# Patient Record
Sex: FEMALE | Race: BLACK OR AFRICAN AMERICAN | NOT HISPANIC OR LATINO
[De-identification: names, ages, dates, MRNs, and addresses within clinical notes are randomized per-mention and may not be internally consistent; named-entity substitution may affect disease eponyms.]

---

## 2023-09-18 ENCOUNTER — APPOINTMENT (OUTPATIENT)
Dept: ANTEPARTUM | Facility: CLINIC | Age: 33
End: 2023-09-18

## 2023-09-20 PROBLEM — Z00.00 ENCOUNTER FOR PREVENTIVE HEALTH EXAMINATION: Status: ACTIVE | Noted: 2023-09-20

## 2023-10-16 ENCOUNTER — NON-APPOINTMENT (OUTPATIENT)
Age: 33
End: 2023-10-16

## 2023-10-19 ENCOUNTER — APPOINTMENT (OUTPATIENT)
Dept: MATERNAL FETAL MEDICINE | Facility: CLINIC | Age: 33
End: 2023-10-19

## 2023-11-13 ENCOUNTER — ASOB RESULT (OUTPATIENT)
Age: 33
End: 2023-11-13

## 2023-11-13 ENCOUNTER — APPOINTMENT (OUTPATIENT)
Dept: ANTEPARTUM | Facility: CLINIC | Age: 33
End: 2023-11-13
Payer: MEDICAID

## 2023-11-13 PROCEDURE — 76811 OB US DETAILED SNGL FETUS: CPT

## 2023-11-30 ENCOUNTER — ASOB RESULT (OUTPATIENT)
Age: 33
End: 2023-11-30

## 2023-11-30 ENCOUNTER — APPOINTMENT (OUTPATIENT)
Dept: ANTEPARTUM | Facility: CLINIC | Age: 33
End: 2023-11-30
Payer: MEDICAID

## 2023-11-30 PROCEDURE — 76816 OB US FOLLOW-UP PER FETUS: CPT

## 2023-12-26 ENCOUNTER — APPOINTMENT (OUTPATIENT)
Dept: ANTEPARTUM | Facility: CLINIC | Age: 33
End: 2023-12-26
Payer: MEDICAID

## 2023-12-26 ENCOUNTER — ASOB RESULT (OUTPATIENT)
Age: 33
End: 2023-12-26

## 2023-12-26 PROCEDURE — 76819 FETAL BIOPHYS PROFIL W/O NST: CPT | Mod: 59

## 2023-12-26 PROCEDURE — 76816 OB US FOLLOW-UP PER FETUS: CPT

## 2024-01-22 ENCOUNTER — APPOINTMENT (OUTPATIENT)
Dept: MATERNAL FETAL MEDICINE | Facility: CLINIC | Age: 34
End: 2024-01-22
Payer: MEDICAID

## 2024-01-22 ENCOUNTER — ASOB RESULT (OUTPATIENT)
Age: 34
End: 2024-01-22

## 2024-01-22 DIAGNOSIS — O24.419 GESTATIONAL DIABETES MELLITUS IN PREGNANCY, UNSPECIFIED CONTROL: ICD-10-CM

## 2024-01-22 PROCEDURE — G0109 DIAB MANAGE TRN IND/GROUP: CPT

## 2024-01-24 ENCOUNTER — ASOB RESULT (OUTPATIENT)
Age: 34
End: 2024-01-24

## 2024-01-24 ENCOUNTER — APPOINTMENT (OUTPATIENT)
Dept: ANTEPARTUM | Facility: CLINIC | Age: 34
End: 2024-01-24
Payer: MEDICAID

## 2024-01-24 ENCOUNTER — TRANSCRIPTION ENCOUNTER (OUTPATIENT)
Age: 34
End: 2024-01-24

## 2024-01-24 PROCEDURE — 76819 FETAL BIOPHYS PROFIL W/O NST: CPT

## 2024-01-24 PROCEDURE — 76816 OB US FOLLOW-UP PER FETUS: CPT

## 2024-01-24 RX ORDER — INSULIN HUMAN 100 [IU]/ML
100 INJECTION, SUSPENSION SUBCUTANEOUS
Qty: 1 | Refills: 2 | Status: ACTIVE | COMMUNITY
Start: 2024-01-22 | End: 1900-01-01

## 2024-01-24 RX ORDER — LANCETS 33 GAUGE
EACH MISCELLANEOUS
Qty: 2 | Refills: 2 | Status: ACTIVE | COMMUNITY
Start: 2024-01-22 | End: 1900-01-01

## 2024-01-24 RX ORDER — BLOOD-GLUCOSE METER
W/DEVICE KIT MISCELLANEOUS
Qty: 1 | Refills: 0 | Status: ACTIVE | COMMUNITY
Start: 2024-01-22 | End: 1900-01-01

## 2024-01-24 RX ORDER — URINE ACETONE TEST STRIPS
STRIP MISCELLANEOUS
Qty: 1 | Refills: 2 | Status: ACTIVE | COMMUNITY
Start: 2024-01-22 | End: 1900-01-01

## 2024-01-24 RX ORDER — PEN NEEDLE, DIABETIC 32GX 5/32"
32G X 4 MM NEEDLE, DISPOSABLE MISCELLANEOUS
Qty: 2 | Refills: 1 | Status: ACTIVE | COMMUNITY
Start: 2024-01-22 | End: 1900-01-01

## 2024-01-24 RX ORDER — BLOOD-GLUCOSE METER
KIT MISCELLANEOUS 4 TIMES DAILY
Qty: 2 | Refills: 2 | Status: ACTIVE | COMMUNITY
Start: 2024-01-22 | End: 1900-01-01

## 2024-02-02 ENCOUNTER — APPOINTMENT (OUTPATIENT)
Dept: MATERNAL FETAL MEDICINE | Facility: CLINIC | Age: 34
End: 2024-02-02

## 2024-02-20 ENCOUNTER — ASOB RESULT (OUTPATIENT)
Age: 34
End: 2024-02-20

## 2024-02-20 ENCOUNTER — APPOINTMENT (OUTPATIENT)
Dept: MATERNAL FETAL MEDICINE | Facility: CLINIC | Age: 34
End: 2024-02-20
Payer: MEDICAID

## 2024-02-20 PROCEDURE — G0108 DIAB MANAGE TRN  PER INDIV: CPT | Mod: 95

## 2024-02-21 ENCOUNTER — APPOINTMENT (OUTPATIENT)
Dept: ANTEPARTUM | Facility: CLINIC | Age: 34
End: 2024-02-21
Payer: MEDICAID

## 2024-02-21 ENCOUNTER — ASOB RESULT (OUTPATIENT)
Age: 34
End: 2024-02-21

## 2024-02-21 PROCEDURE — 76816 OB US FOLLOW-UP PER FETUS: CPT

## 2024-02-21 PROCEDURE — 76819 FETAL BIOPHYS PROFIL W/O NST: CPT | Mod: 59

## 2024-02-29 ENCOUNTER — APPOINTMENT (OUTPATIENT)
Dept: ANTEPARTUM | Facility: CLINIC | Age: 34
End: 2024-02-29
Payer: MEDICAID

## 2024-02-29 ENCOUNTER — ASOB RESULT (OUTPATIENT)
Age: 34
End: 2024-02-29

## 2024-02-29 PROCEDURE — 76818 FETAL BIOPHYS PROFILE W/NST: CPT

## 2024-03-05 ENCOUNTER — APPOINTMENT (OUTPATIENT)
Dept: MATERNAL FETAL MEDICINE | Facility: CLINIC | Age: 34
End: 2024-03-05

## 2024-03-07 ENCOUNTER — ASOB RESULT (OUTPATIENT)
Age: 34
End: 2024-03-07

## 2024-03-07 ENCOUNTER — APPOINTMENT (OUTPATIENT)
Dept: ANTEPARTUM | Facility: CLINIC | Age: 34
End: 2024-03-07
Payer: MEDICAID

## 2024-03-07 PROCEDURE — 76818 FETAL BIOPHYS PROFILE W/NST: CPT

## 2024-03-13 ENCOUNTER — TRANSCRIPTION ENCOUNTER (OUTPATIENT)
Age: 34
End: 2024-03-13

## 2024-03-13 ENCOUNTER — INPATIENT (INPATIENT)
Facility: HOSPITAL | Age: 34
LOS: 3 days | Discharge: HOME | End: 2024-03-17
Attending: STUDENT IN AN ORGANIZED HEALTH CARE EDUCATION/TRAINING PROGRAM | Admitting: STUDENT IN AN ORGANIZED HEALTH CARE EDUCATION/TRAINING PROGRAM

## 2024-03-13 VITALS
DIASTOLIC BLOOD PRESSURE: 96 MMHG | SYSTOLIC BLOOD PRESSURE: 142 MMHG | TEMPERATURE: 99 F | HEART RATE: 107 BPM | RESPIRATION RATE: 16 BRPM

## 2024-03-13 DIAGNOSIS — O26.899 OTHER SPECIFIED PREGNANCY RELATED CONDITIONS, UNSPECIFIED TRIMESTER: ICD-10-CM

## 2024-03-13 DIAGNOSIS — Z98.891 HISTORY OF UTERINE SCAR FROM PREVIOUS SURGERY: Chronic | ICD-10-CM

## 2024-03-13 LAB
ALBUMIN SERPL ELPH-MCNC: 3.5 G/DL — SIGNIFICANT CHANGE UP (ref 3.3–5)
ALP SERPL-CCNC: 96 U/L — SIGNIFICANT CHANGE UP (ref 40–120)
ALT FLD-CCNC: 14 U/L — SIGNIFICANT CHANGE UP (ref 4–33)
ANION GAP SERPL CALC-SCNC: 12 MMOL/L — SIGNIFICANT CHANGE UP (ref 7–14)
APPEARANCE UR: ABNORMAL
APTT BLD: 26 SEC — SIGNIFICANT CHANGE UP (ref 24.5–35.6)
AST SERPL-CCNC: 14 U/L — SIGNIFICANT CHANGE UP (ref 4–32)
BACTERIA # UR AUTO: ABNORMAL /HPF
BASOPHILS # BLD AUTO: 0.02 K/UL — SIGNIFICANT CHANGE UP (ref 0–0.2)
BASOPHILS NFR BLD AUTO: 0.2 % — SIGNIFICANT CHANGE UP (ref 0–2)
BILIRUB SERPL-MCNC: 0.2 MG/DL — SIGNIFICANT CHANGE UP (ref 0.2–1.2)
BILIRUB UR-MCNC: NEGATIVE — SIGNIFICANT CHANGE UP
BLD GP AB SCN SERPL QL: NEGATIVE — SIGNIFICANT CHANGE UP
BUN SERPL-MCNC: 18 MG/DL — SIGNIFICANT CHANGE UP (ref 7–23)
CALCIUM SERPL-MCNC: 9.6 MG/DL — SIGNIFICANT CHANGE UP (ref 8.4–10.5)
CAST: 11 /LPF — HIGH (ref 0–4)
CHLORIDE SERPL-SCNC: 106 MMOL/L — SIGNIFICANT CHANGE UP (ref 98–107)
CO2 SERPL-SCNC: 19 MMOL/L — LOW (ref 22–31)
COARSE GRAN CASTS #/AREA URNS AUTO: PRESENT
COLOR SPEC: SIGNIFICANT CHANGE UP
CREAT ?TM UR-MCNC: 312 MG/DL — SIGNIFICANT CHANGE UP
CREAT SERPL-MCNC: 0.7 MG/DL — SIGNIFICANT CHANGE UP (ref 0.5–1.3)
DIFF PNL FLD: NEGATIVE — SIGNIFICANT CHANGE UP
EGFR: 119 ML/MIN/1.73M2 — SIGNIFICANT CHANGE UP
EOSINOPHIL # BLD AUTO: 0.13 K/UL — SIGNIFICANT CHANGE UP (ref 0–0.5)
EOSINOPHIL NFR BLD AUTO: 1.3 % — SIGNIFICANT CHANGE UP (ref 0–6)
FIBRINOGEN PPP-MCNC: 592 MG/DL — HIGH (ref 200–465)
GLUCOSE BLDC GLUCOMTR-MCNC: 124 MG/DL — HIGH (ref 70–99)
GLUCOSE SERPL-MCNC: 145 MG/DL — HIGH (ref 70–99)
GLUCOSE UR QL: >=1000 MG/DL
HCT VFR BLD CALC: 35.1 % — SIGNIFICANT CHANGE UP (ref 34.5–45)
HGB BLD-MCNC: 11.8 G/DL — SIGNIFICANT CHANGE UP (ref 11.5–15.5)
IANC: 6.83 K/UL — SIGNIFICANT CHANGE UP (ref 1.8–7.4)
IMM GRANULOCYTES NFR BLD AUTO: 0.5 % — SIGNIFICANT CHANGE UP (ref 0–0.9)
INR BLD: 1.01 RATIO — SIGNIFICANT CHANGE UP (ref 0.85–1.18)
KETONES UR-MCNC: ABNORMAL MG/DL
LDH SERPL L TO P-CCNC: 147 U/L — SIGNIFICANT CHANGE UP (ref 135–225)
LEUKOCYTE ESTERASE UR-ACNC: NEGATIVE — SIGNIFICANT CHANGE UP
LYMPHOCYTES # BLD AUTO: 2.16 K/UL — SIGNIFICANT CHANGE UP (ref 1–3.3)
LYMPHOCYTES # BLD AUTO: 22 % — SIGNIFICANT CHANGE UP (ref 13–44)
MCHC RBC-ENTMCNC: 30 PG — SIGNIFICANT CHANGE UP (ref 27–34)
MCHC RBC-ENTMCNC: 33.6 GM/DL — SIGNIFICANT CHANGE UP (ref 32–36)
MCV RBC AUTO: 89.3 FL — SIGNIFICANT CHANGE UP (ref 80–100)
MONOCYTES # BLD AUTO: 0.64 K/UL — SIGNIFICANT CHANGE UP (ref 0–0.9)
MONOCYTES NFR BLD AUTO: 6.5 % — SIGNIFICANT CHANGE UP (ref 2–14)
NEUTROPHILS # BLD AUTO: 6.83 K/UL — SIGNIFICANT CHANGE UP (ref 1.8–7.4)
NEUTROPHILS NFR BLD AUTO: 69.5 % — SIGNIFICANT CHANGE UP (ref 43–77)
NITRITE UR-MCNC: NEGATIVE — SIGNIFICANT CHANGE UP
NRBC # BLD: 0 /100 WBCS — SIGNIFICANT CHANGE UP (ref 0–0)
NRBC # FLD: 0 K/UL — SIGNIFICANT CHANGE UP (ref 0–0)
PH UR: 6 — SIGNIFICANT CHANGE UP (ref 5–8)
PLATELET # BLD AUTO: 443 K/UL — HIGH (ref 150–400)
POTASSIUM SERPL-MCNC: 4.5 MMOL/L — SIGNIFICANT CHANGE UP (ref 3.5–5.3)
POTASSIUM SERPL-SCNC: 4.5 MMOL/L — SIGNIFICANT CHANGE UP (ref 3.5–5.3)
PROT ?TM UR-MCNC: 285 MG/DL — SIGNIFICANT CHANGE UP
PROT SERPL-MCNC: 7.5 G/DL — SIGNIFICANT CHANGE UP (ref 6–8.3)
PROT UR-MCNC: 300 MG/DL
PROT/CREAT UR-RTO: 0.9 RATIO — HIGH (ref 0–0.2)
PROTHROM AB SERPL-ACNC: 11.4 SEC — SIGNIFICANT CHANGE UP (ref 9.5–13)
RBC # BLD: 3.93 M/UL — SIGNIFICANT CHANGE UP (ref 3.8–5.2)
RBC # FLD: 13.5 % — SIGNIFICANT CHANGE UP (ref 10.3–14.5)
RBC CASTS # UR COMP ASSIST: 1 /HPF — SIGNIFICANT CHANGE UP (ref 0–4)
REVIEW: SIGNIFICANT CHANGE UP
RH IG SCN BLD-IMP: POSITIVE — SIGNIFICANT CHANGE UP
RH IG SCN BLD-IMP: POSITIVE — SIGNIFICANT CHANGE UP
SODIUM SERPL-SCNC: 137 MMOL/L — SIGNIFICANT CHANGE UP (ref 135–145)
SP GR SPEC: 1.04 — HIGH (ref 1–1.03)
SQUAMOUS # UR AUTO: 4 /HPF — SIGNIFICANT CHANGE UP (ref 0–5)
URATE SERPL-MCNC: 4.8 MG/DL — SIGNIFICANT CHANGE UP (ref 2.5–7)
UROBILINOGEN FLD QL: 1 MG/DL — SIGNIFICANT CHANGE UP (ref 0.2–1)
WBC # BLD: 9.83 K/UL — SIGNIFICANT CHANGE UP (ref 3.8–10.5)
WBC # FLD AUTO: 9.83 K/UL — SIGNIFICANT CHANGE UP (ref 3.8–10.5)
WBC UR QL: 4 /HPF — SIGNIFICANT CHANGE UP (ref 0–5)

## 2024-03-13 DEVICE — INTERCEED 3 X 4": Type: IMPLANTABLE DEVICE | Status: FUNCTIONAL

## 2024-03-13 RX ORDER — CITRIC ACID/SODIUM CITRATE 300-500 MG
30 SOLUTION, ORAL ORAL ONCE
Refills: 0 | Status: COMPLETED | OUTPATIENT
Start: 2024-03-13 | End: 2024-03-13

## 2024-03-13 RX ORDER — SODIUM CHLORIDE 9 MG/ML
1000 INJECTION, SOLUTION INTRAVENOUS
Refills: 0 | Status: DISCONTINUED | OUTPATIENT
Start: 2024-03-13 | End: 2024-03-14

## 2024-03-13 RX ORDER — FAMOTIDINE 10 MG/ML
20 INJECTION INTRAVENOUS ONCE
Refills: 0 | Status: COMPLETED | OUTPATIENT
Start: 2024-03-13 | End: 2024-03-13

## 2024-03-13 RX ORDER — CHLORHEXIDINE GLUCONATE 213 G/1000ML
1 SOLUTION TOPICAL DAILY
Refills: 0 | Status: DISCONTINUED | OUTPATIENT
Start: 2024-03-13 | End: 2024-03-14

## 2024-03-13 RX ORDER — SODIUM CHLORIDE 9 MG/ML
500 INJECTION, SOLUTION INTRAVENOUS ONCE
Refills: 0 | Status: COMPLETED | OUTPATIENT
Start: 2024-03-13 | End: 2024-03-13

## 2024-03-13 RX ORDER — OXYTOCIN 10 UNIT/ML
333.33 VIAL (ML) INJECTION
Qty: 20 | Refills: 0 | Status: DISCONTINUED | OUTPATIENT
Start: 2024-03-13 | End: 2024-03-14

## 2024-03-13 RX ORDER — SODIUM CHLORIDE 9 MG/ML
1000 INJECTION, SOLUTION INTRAVENOUS ONCE
Refills: 0 | Status: DISCONTINUED | OUTPATIENT
Start: 2024-03-13 | End: 2024-03-14

## 2024-03-13 RX ADMIN — Medication 30 MILLILITER(S): at 23:13

## 2024-03-13 RX ADMIN — CHLORHEXIDINE GLUCONATE 1 APPLICATION(S): 213 SOLUTION TOPICAL at 21:53

## 2024-03-13 RX ADMIN — FAMOTIDINE 20 MILLIGRAM(S): 10 INJECTION INTRAVENOUS at 22:59

## 2024-03-13 RX ADMIN — SODIUM CHLORIDE 125 MILLILITER(S): 9 INJECTION, SOLUTION INTRAVENOUS at 21:53

## 2024-03-13 RX ADMIN — SODIUM CHLORIDE 500 MILLILITER(S): 9 INJECTION, SOLUTION INTRAVENOUS at 20:13

## 2024-03-13 NOTE — OB PROVIDER TRIAGE NOTE - HISTORY OF PRESENT ILLNESS
32y/o  @38.1wks presents from the office with elevated BPs 145/102, 145/99, 130/99.  Denies sob, epigastric pain, blurry vision, and HA  Reports good fetal movement  Denies LOF/VB    Allergies: Denies  Medications: PNV, Insulin 14units qhs (has not taken in 2 weeks)  NPO 1030

## 2024-03-13 NOTE — OB PROVIDER TRIAGE NOTE - NS_OBGYNHISTORY_OBGYN_ALL_OB_FT
Orthopaedic Surgery - Office Note  Russ Denis (89 y.o. female)   : 1965   MRN: [id number]  Encounter Date: 2023    Chief Complaint   Patient presents with   • Left Knee - Follow-up   • Right Knee - Follow-up       Assessment / Plan  Bilateral knee mild osteoarthritis     · After discussion of risk and benefits the patient agreed to proceed with bilateral steroid injections of her knees. These were performed and prepared sterilely and tolerated well. · We did discuss arthritis treatment going forward for both knees. This includes conservative treatment such as anti-inflammatories and modalities such as ice or heat. This also included steroid injections every 3 months versus viscosupplementation. She will contact us in the future she would like to proceed with viscosupplementation. · Continue with ice and NSAIDs/analgesics as needed. · Continue with activity as tolerated. Return if symptoms worsen or fail to improve. History of Present Illness  Russ Denis is a 66 y.o. female who presents today for bilateral knee pain secondary to arthritis. Last visit on 2023 patient did have a right knee steroid injection which helped up to recently. Prior to that patient received bilateral steroid injections on 2022. Patient has been doing well with steroid injections with pretty lasting relief with each of the shots. Patient describes her pain as achy. Patient states she has trouble going up and down stairs, getting in and out of the car, as well as long distance walking. Patient uses ice and OTC medications for pain. Patient has been working on weight loss. Patient is still considering viscosupplementation as an option going forward but is still holding off at this time. Review of Systems  Pertinent items are noted in HPI. All other systems were reviewed and are negative.     Physical Exam  /81   Pulse 62   Ht 5' 5" (1.651 m)   Wt 93.4 kg (206 lb)   BMI 34.28 kg/m² Cons: Appears well. No apparent distress. Psych: Alert. Oriented x3. Mood and affect normal.  Eyes: PERRLA, EOMI  Resp: Normal effort. No audible wheezing or stridor. CV: Palpable pulse. No discernable arrhythmia. No LE edema. Lymph:  No palpable cervical, axillary, or inguinal lymphadenopathy. Skin: Warm. No palpable masses. No visible lesions. Neuro: Normal muscle tone. Normal and symmetric DTR's. Bilateral Knee Exam  Alignment:  Normal knee alignment. Inspection:  No swelling. Palpation:  lateral joint line tenderness. ROM:  Normal knee ROM. Strength:  5/5 quadriceps and hamstrings. Stability:  No objective knee instability. Stable Varus / Valgus stress, Lachman, and Posterior drawer. Tests:  No pertinent positive or negative tests. Patella:  Not tested. Neurovascular:  Sensation intact in DP/SP/Villavicencio/Sa/T nerve distributions. 2+ DP & PT pulses. Gait:  Normal.    Studies Reviewed  No studies to review    Large joint arthrocentesis: bilateral knee  Universal Protocol:  Consent: Verbal consent obtained. Consent given by: patient  Patient identity confirmed: verbally with patient    Supporting Documentation  Indications: pain   Procedure Details  Location: knee - bilateral knee  Needle size: 25 G  Approach: anterolateral    Medications (Right): 6 mL bupivacaine (PF) 0.5 %; 1 mL methylPREDNISolone acetate 40 mg/mLMedications (Left): 6 mL bupivacaine (PF) 0.5 %; 1 mL methylPREDNISolone acetate 40 mg/mL   Patient tolerance: patient tolerated the procedure well with no immediate complications  Dressing:  Sterile dressing applied             Medical, Surgical, Family, and Social History  The patient's medical history, family history, and social history, were reviewed and updated as appropriate.     Past Medical History:   Diagnosis Date   • Ankle fracture    • Anxiety    • Asthma    • Bleeding hemorrhoids 4/13/2017   • Chronic anal fissure 11/29/2018    Formatting of this note might be different from the original. Added automatically from request for surgery [id number]   • Chronic venous embolism and thrombosis of deep vessels of lower extremity ([address])    • Costochondritis     RESOLVED: [id number]   • Depression    • Diabetes mellitus ([address]) 2021   • Endometriosis    • Fibromyalgia    • GERD (gastroesophageal reflux disease)    • Hematuria     LAST ASSESSED: [id number]   • Hydradenitis     axillary area and under breast   • Hypertension     LAST ASSESSED: [id number]   • Irregular heart beat    • Pulmonary embolism ([address])     RESOLED: [id number] - secondary to a lupron injection for endometriosis   • RBBB (right bundle branch block)    • Sleep apnea     Cannot tolerate CPAP   • Umbilical hernia     LAST ASSESSED: [id number]       Past Surgical History:   Procedure Laterality Date   • HERNIA REPAIR      umbilical   • HUMERUS FRACTURE SURGERY W/ IMPLANT Left    • LAPAROSCOPIC TOTAL HYSTERECTOMY     • PELVIC LAPAROSCOPY      x4 for endometriosis   • HI ESOPHAGOGASTRODUODENOSCOPY TRANSORAL DIAGNOSTIC N/A 2019    Procedure: ESOPHAGOGASTRODUODENOSCOPY (EGD) with bx;  Surgeon: Lu Escobedo MD;  Location: [facility];   Service: Gastroenterology       Family History   Problem Relation Age of Onset   • Hypertension Mother    • Diabetes Father    • Hypertension Father    • Obesity Father    • No Known Problems Maternal Grandmother    • No Known Problems Maternal Grandfather    • Breast cancer Paternal Grandmother 80   • No Known Problems Paternal Grandfather    • No Known Problems Maternal Aunt        Social History     Occupational History   • Occupation: Cleaning Offices   Tobacco Use   • Smoking status: Every Day     Packs/day: 0.50     Types: Cigarettes     Last attempt to quit: 8/10/2018     Years since quittin.0   • Smokeless tobacco: Never   • Tobacco comments:     smokes 1 cigarette occasionally   Vaping Use   • Vaping Use: Never used   Substance and Sexual Activity   • Alcohol use: No   • Drug use: No   • Sexual activity: Yes     Comment: seldom /hysterectomy       Allergies   Allergen Reactions   • Albuterol      Other reaction(s): felt weird   • Azithromycin GI Intolerance   • Duloxetine      Category: Adverse Reaction; Annotation - [id number]: Sweating   • Erythromycin Vomiting   • Hydrocodone-Acetaminophen      Other reaction(s): sweating, feel faint, diarrhea   • Hydrocodone-Acetaminophen    • Ketorolac Diarrhea and Nausea Only     Category: Adverse Reaction;  Annotation - [id number]: Symptoms were noted after injection into bursa with Toradol at orthopedics   • Penicillin G    • Penicillins Hives and Vomiting   • Tramadol          Current Outpatient Medications:   •  albuterol (PROVENTIL HFA,VENTOLIN HFA) 90 mcg/act inhaler, INHALE 2 PUFFS EVERY 6 HOURS AS NEEDED FOR WHEEZE, Disp: 18 g, Rfl: 2  •  Aspirin 81 MG EC tablet, Take by mouth, Disp: , Rfl:   •  azelastine (ASTELIN) 0.1 % nasal spray, 1 spray into each nostril 2 (two) times a day Use in each nostril as directed, Disp: 30 mL, Rfl: 2  •  clindamycin (CLEOCIN T) 1 % lotion, APPLY ONCE DAILY TO AFFECTED AREAS UNDER ARMS., Disp: , Rfl: 3  •  [employer] Olopatadine HCl 0.2 % opth drops, INSTILL 1 DROP INTO BOTH EYES DAILY, Disp: 7.5 mL, Rfl: 1  •  escitalopram (LEXAPRO) 20 mg tablet, TAKE 1 TABLET BY MOUTH EVERY DAY, Disp: 90 tablet, Rfl: 1  •  famotidine (PEPCID) 40 MG tablet, TAKE 1 TABLET BY MOUTH EVERY DAY, Disp: 90 tablet, Rfl: 3  •  fluticasone (FLONASE) 50 mcg/act nasal spray, SPRAY 1 SPRAY INTO EACH NOSTRIL EVERY DAY, Disp: 24 mL, Rfl: 2  •  loperamide (IMODIUM A-D) 2 MG tablet, Take 2 mg by mouth 4 (four) times a day as needed for diarrhea, Disp: , Rfl:   •  loratadine (CLARITIN) 10 mg tablet, Take 10 mg by mouth, Disp: , Rfl:   •  losartan (COZAAR) 50 mg tablet, Take 1 tablet (50 mg total) by mouth 2 (two) times a day, Disp: 120 tablet, Rfl: 3  •  montelukast (SINGULAIR) 10 mg tablet, TAKE 1 TABLET BY MOUTH EVERY DAY, Disp: 90 tablet, Rfl: 3  •  pantoprazole (PROTONIX) 40 mg tablet, TAKE 1 TABLET BY MOUTH EVERY DAY, Disp: 90 tablet, Rfl: 3  •  sucralfate (CARAFATE) 1 g tablet, TAKE 1 TABLET BY MOUTH FOUR TIMES A DAY, Disp: 360 tablet, Rfl: 1  •  traZODone (DESYREL) 50 mg tablet, TAKE 1 TABLET BY MOUTH EVERYDAY AT BEDTIME, Disp: 90 tablet, Rfl: 1  •  Blood Glucose Monitoring Suppl (OneTouch Verio) w/Device KIT, USE DAILY AS DIRECTED (Patient not taking: Reported on 8/14/2023), Disp: 1 kit, Rfl: 0  •  Cholecalciferol (VITAMIN D3) 1000 units CAPS, Take 2,000 Units by mouth (Patient not taking: Reported on 8/14/2023), Disp: , Rfl:   •  Cyanocobalamin (B-12) 1000 MCG CAPS, Take by mouth (Patient not taking: Reported on 8/14/2023), Disp: , Rfl:   •  [START ON 5/5/2024] dexamethasone (DECADRON) 1 mg tablet, Take 1 tab at 11pm and get cortisol levels checked between 7:00 a.m. and 9:00 a.m. on the next morning. Do not start before May 5, 2024. (Patient not taking: Reported on 8/7/2023 Do not start before May 5, 2024.), Disp: 1 tablet, Rfl: 0  •  EPINEPHrine (EPIPEN) 0.3 mg/0.3 mL SOAJ, AS NEEDED FOR ALLERGIC REACTION.  BRING TO ALLERGY SHOTS (Patient not taking: Reported on 6/26/2023), Disp: , Rfl:   •  glucose blood (OneTouch Verio) test strip, USE TO TEST ONCE DAILY (Patient not taking: Reported on 8/14/2023), Disp: 100 strip, Rfl: 1  •  ketotifen (ZADITOR) 0.025 % ophthalmic solution, INSTILL 1 DROP PER EYE DAILY AS NEEDED, Disp: , Rfl:   •  Klor-Con M20 20 MEQ tablet, , Disp: , Rfl:   •  OneTouch Delica Lancets 66O MISC, USE DAILY AS DIRECTED (Patient not taking: Reported on 8/14/2023), Disp: 100 each, Rfl: 0  •  pimecrolimus (ELIDEL) 1 % cream, APPLY TOPICALLY 2 TIMES A DAY AS NEEDED FOR RASH (Patient not taking: Reported on 8/14/2023), Disp: 30 g, Rfl: 2      Jeane Pena PA-C    Scribe Attestation    I,:   am acting as a scribe while in the presence of the attending physician.:       I,:   personally performed the services described in this documentation    as scribed in my presence.: GYN: Denies  OB: C/S 6/26/2014, breech/nuchal     AP course complicated by  -GDMA2- stopped her insulin 2 weeks ago  -Elevated BPs  -unknown gbs, has not been to the office in 6 weeks

## 2024-03-13 NOTE — OB RN TRIAGE NOTE - NSLDARRIVAL_OBGYN_ALL_OB_START_DATE
[Dear  ___] : Dear ~DESIREE, [Consult Letter:] : I had the pleasure of evaluating your patient, [unfilled]. [Please see my note below.] : Please see my note below. [Consult Closing:] : Thank you very much for allowing me to participate in the care of this patient.  If you have any questions, please do not hesitate to contact me. [FreeTextEntry2] : Dr. Cornel Winslow, Dr. Cyndee Lujan [Sincerely,] : Sincerely, [FreeTextEntry3] : Marcin Vasquez MD, FACS\par System Director, Endocrine Surgery\par [facility]\par  [DrAris  ___] : Dr. HOLLNAD 13-Mar-2024 18:37

## 2024-03-13 NOTE — OB PROVIDER TRIAGE NOTE - NSHPPHYSICALEXAM_GEN_ALL_CORE
Vital Signs Last 24 Hrs  T(C): 37.0 (13 Mar 2024 19:11), Max: 37.0 (13 Mar 2024 19:11)  T(F): 98.6 (13 Mar 2024 19:11), Max: 98.6 (13 Mar 2024 19:11)  HR: 102 (13 Mar 2024 19:44) (97 - 107)  BP: 136/86 (13 Mar 2024 19:44) (134/89 - 142/96)  RR: 16 (13 Mar 2024 19:11) (16 - 16)    Assessment reveals VSS  General: Female sitting comfortably in no apparent distress  Neuro: No facial asymmetry, no slurred speech, moves all 4 extremities  Mood: Alert and lucid, appropriate mood and affect  A&Ox3  Lungs- clear bilateral  Heart- normal rate and regular rhythm  Extremities- Warm, Dry, no edema present, good pulses   Abdomen soft, NT, gravid  Cat 1 tracing reactive, ctx every 2-5mins  Transabdominal Ultrasound- images saved ASOB  Vaginal Exam-         PLAN:  HELLP labs

## 2024-03-13 NOTE — OB PROVIDER H&P - HISTORY OF PRESENT ILLNESS
34y/o  @38.1wks presents from the office with elevated BPs 145/102, 145/99, 130/99. Patient is also a non compliant GDMA2  Denies sob, epigastric pain, blurry vision, and HA  Reports good fetal movement  Denies LOF/VB    Allergies: Denies  Medications: PNV, Insulin 14units qhs (has not taken in 2 weeks)  NPO 1030

## 2024-03-13 NOTE — OB PROVIDER H&P - NSLOWPPHRISK_OBGYN_A_OB
details…
Carlos Pregnancy/Less than or equal to 4 previous vaginal births/No known bleeding disorder/No history of postpartum hemorrhage

## 2024-03-13 NOTE — OB PROVIDER H&P - NS_OBGYNHISTORY_OBGYN_ALL_OB_FT
GYN: Denies  OB: C/S 6/26/2014, breech/nuchal     AP course complicated by  -GDMA2- stopped her insulin 2 weeks ago  -Elevated BPs  -unknown gbs, has not been to the office in 6 weeks

## 2024-03-13 NOTE — OB PROVIDER H&P - PROBLEM SELECTOR PLAN 1
Admit for repeat C/S  D/W Dr. Valderrama  Routine Orders  Labs Sent  HELLP labs pending  GDMA2- non compliant   GBS Unknown   Cat 1 tracing, minimal variability , Dr. Valderrama made aware   Huddle to be completed Admit for repeat C/S  D/W Dr. Valderrama  Routine Orders  Labs Sent  HELLP labs pending  GDMA2- non compliant   GBS Unknown   Cat 1 tracing, minimal variability , Dr. Valderrama made aware   Huddle to be completed  Dr. Damon (safety officerC) made aware of patient and Anesthesia made aware of section to go, will huddle when available Admit for repeat C/S  D/W Dr. Valderrama  Routine Orders  Labs Sent  HELLP labs pending  GDMA2- non compliant   GBS Unknown   Cat 2 tracing, minimal variability , Dr. Valderrama made aware   Huddle to be completed  Dr. Damon (safety officerC) made aware of patient and Anesthesia made aware of section to go, will huddle when available Admit for repeat C/S  D/W Dr. Valderrama  Routine Orders  Labs Sent  HELLP labs pending  GDMA2- non compliant   GBS Unknown   Cat 2 tracing, minimal variability , Dr. Valderrama made aware   Huddle to be completed  Dr. Damon (safety officerC) made aware of patient and Anesthesia made aware of section to go, will huddle when available    agree with above findings. 32yo  @ 38w1d with GDMA2 and Preeclampsia without severe features. Noncompliant with visit and medications. Nonreactive NST  Admit to L&D  Plan for RCS  F/u labs and FS   F/u BPs  China Valderrama MD MPH

## 2024-03-14 ENCOUNTER — TRANSCRIPTION ENCOUNTER (OUTPATIENT)
Age: 34
End: 2024-03-14

## 2024-03-14 ENCOUNTER — APPOINTMENT (OUTPATIENT)
Dept: ANTEPARTUM | Facility: CLINIC | Age: 34
End: 2024-03-14

## 2024-03-14 LAB
ALBUMIN SERPL ELPH-MCNC: 3.1 G/DL — LOW (ref 3.3–5)
ALP SERPL-CCNC: 78 U/L — SIGNIFICANT CHANGE UP (ref 40–120)
ALT FLD-CCNC: 13 U/L — SIGNIFICANT CHANGE UP (ref 4–33)
ANION GAP SERPL CALC-SCNC: 11 MMOL/L — SIGNIFICANT CHANGE UP (ref 7–14)
APTT BLD: 26.6 SEC — SIGNIFICANT CHANGE UP (ref 24.5–35.6)
AST SERPL-CCNC: 20 U/L — SIGNIFICANT CHANGE UP (ref 4–32)
BILIRUB SERPL-MCNC: 0.3 MG/DL — SIGNIFICANT CHANGE UP (ref 0.2–1.2)
BUN SERPL-MCNC: 17 MG/DL — SIGNIFICANT CHANGE UP (ref 7–23)
CALCIUM SERPL-MCNC: 8.8 MG/DL — SIGNIFICANT CHANGE UP (ref 8.4–10.5)
CHLORIDE SERPL-SCNC: 101 MMOL/L — SIGNIFICANT CHANGE UP (ref 98–107)
CO2 SERPL-SCNC: 17 MMOL/L — LOW (ref 22–31)
CREAT SERPL-MCNC: 0.71 MG/DL — SIGNIFICANT CHANGE UP (ref 0.5–1.3)
EGFR: 117 ML/MIN/1.73M2 — SIGNIFICANT CHANGE UP
FIBRINOGEN PPP-MCNC: 433 MG/DL — SIGNIFICANT CHANGE UP (ref 200–465)
GLUCOSE SERPL-MCNC: 213 MG/DL — HIGH (ref 70–99)
HCT VFR BLD CALC: 33 % — LOW (ref 34.5–45)
HGB BLD-MCNC: 11 G/DL — LOW (ref 11.5–15.5)
INR BLD: 1.04 RATIO — SIGNIFICANT CHANGE UP (ref 0.85–1.18)
LDH SERPL L TO P-CCNC: 209 U/L — SIGNIFICANT CHANGE UP (ref 135–225)
MCHC RBC-ENTMCNC: 30 PG — SIGNIFICANT CHANGE UP (ref 27–34)
MCHC RBC-ENTMCNC: 33.3 GM/DL — SIGNIFICANT CHANGE UP (ref 32–36)
MCV RBC AUTO: 89.9 FL — SIGNIFICANT CHANGE UP (ref 80–100)
NRBC # BLD: 0 /100 WBCS — SIGNIFICANT CHANGE UP (ref 0–0)
NRBC # FLD: 0 K/UL — SIGNIFICANT CHANGE UP (ref 0–0)
PLATELET # BLD AUTO: 352 K/UL — SIGNIFICANT CHANGE UP (ref 150–400)
POTASSIUM SERPL-MCNC: 4.4 MMOL/L — SIGNIFICANT CHANGE UP (ref 3.5–5.3)
POTASSIUM SERPL-SCNC: 4.4 MMOL/L — SIGNIFICANT CHANGE UP (ref 3.5–5.3)
PROT SERPL-MCNC: 6.2 G/DL — SIGNIFICANT CHANGE UP (ref 6–8.3)
PROTHROM AB SERPL-ACNC: 11.6 SEC — SIGNIFICANT CHANGE UP (ref 9.5–13)
RBC # BLD: 3.67 M/UL — LOW (ref 3.8–5.2)
RBC # FLD: 13.6 % — SIGNIFICANT CHANGE UP (ref 10.3–14.5)
SODIUM SERPL-SCNC: 129 MMOL/L — LOW (ref 135–145)
T PALLIDUM AB TITR SER: NEGATIVE — SIGNIFICANT CHANGE UP
URATE SERPL-MCNC: 5.1 MG/DL — SIGNIFICANT CHANGE UP (ref 2.5–7)
WBC # BLD: 14.93 K/UL — HIGH (ref 3.8–10.5)
WBC # FLD AUTO: 14.93 K/UL — HIGH (ref 3.8–10.5)

## 2024-03-14 RX ORDER — DIPHENHYDRAMINE HCL 50 MG
25 CAPSULE ORAL EVERY 6 HOURS
Refills: 0 | Status: DISCONTINUED | OUTPATIENT
Start: 2024-03-14 | End: 2024-03-17

## 2024-03-14 RX ORDER — SODIUM CHLORIDE 9 MG/ML
1000 INJECTION, SOLUTION INTRAVENOUS
Refills: 0 | Status: DISCONTINUED | OUTPATIENT
Start: 2024-03-14 | End: 2024-03-17

## 2024-03-14 RX ORDER — KETOROLAC TROMETHAMINE 30 MG/ML
30 SYRINGE (ML) INJECTION EVERY 6 HOURS
Refills: 0 | Status: DISCONTINUED | OUTPATIENT
Start: 2024-03-14 | End: 2024-03-15

## 2024-03-14 RX ORDER — IBUPROFEN 200 MG
1 TABLET ORAL
Qty: 0 | Refills: 0 | DISCHARGE
Start: 2024-03-14

## 2024-03-14 RX ORDER — MORPHINE SULFATE 50 MG/1
0.1 CAPSULE, EXTENDED RELEASE ORAL ONCE
Refills: 0 | Status: DISCONTINUED | OUTPATIENT
Start: 2024-03-14 | End: 2024-03-15

## 2024-03-14 RX ORDER — OXYTOCIN 10 UNIT/ML
333.33 VIAL (ML) INJECTION
Qty: 20 | Refills: 0 | Status: DISCONTINUED | OUTPATIENT
Start: 2024-03-14 | End: 2024-03-15

## 2024-03-14 RX ORDER — HEPARIN SODIUM 5000 [USP'U]/ML
5000 INJECTION INTRAVENOUS; SUBCUTANEOUS EVERY 12 HOURS
Refills: 0 | Status: DISCONTINUED | OUTPATIENT
Start: 2024-03-14 | End: 2024-03-17

## 2024-03-14 RX ORDER — ACETAMINOPHEN 500 MG
3 TABLET ORAL
Qty: 0 | Refills: 0 | DISCHARGE
Start: 2024-03-14

## 2024-03-14 RX ORDER — IBUPROFEN 200 MG
600 TABLET ORAL EVERY 6 HOURS
Refills: 0 | Status: COMPLETED | OUTPATIENT
Start: 2024-03-14 | End: 2025-02-10

## 2024-03-14 RX ORDER — ACETAMINOPHEN 500 MG
1000 TABLET ORAL ONCE
Refills: 0 | Status: COMPLETED | OUTPATIENT
Start: 2024-03-14 | End: 2024-03-14

## 2024-03-14 RX ORDER — OXYCODONE HYDROCHLORIDE 5 MG/1
5 TABLET ORAL
Refills: 0 | Status: COMPLETED | OUTPATIENT
Start: 2024-03-14 | End: 2024-03-21

## 2024-03-14 RX ORDER — BUTORPHANOL TARTRATE 2 MG/ML
0.25 INJECTION, SOLUTION INTRAMUSCULAR; INTRAVENOUS EVERY 6 HOURS
Refills: 0 | Status: DISCONTINUED | OUTPATIENT
Start: 2024-03-14 | End: 2024-03-14

## 2024-03-14 RX ORDER — OXYCODONE HYDROCHLORIDE 5 MG/1
10 TABLET ORAL
Refills: 0 | Status: DISCONTINUED | OUTPATIENT
Start: 2024-03-14 | End: 2024-03-14

## 2024-03-14 RX ORDER — ONDANSETRON 8 MG/1
4 TABLET, FILM COATED ORAL EVERY 6 HOURS
Refills: 0 | Status: DISCONTINUED | OUTPATIENT
Start: 2024-03-14 | End: 2024-03-15

## 2024-03-14 RX ORDER — ACETAMINOPHEN 500 MG
975 TABLET ORAL
Refills: 0 | Status: DISCONTINUED | OUTPATIENT
Start: 2024-03-14 | End: 2024-03-17

## 2024-03-14 RX ORDER — OXYCODONE HYDROCHLORIDE 5 MG/1
5 TABLET ORAL
Refills: 0 | Status: DISCONTINUED | OUTPATIENT
Start: 2024-03-14 | End: 2024-03-14

## 2024-03-14 RX ORDER — OXYCODONE HYDROCHLORIDE 5 MG/1
5 TABLET ORAL ONCE
Refills: 0 | Status: DISCONTINUED | OUTPATIENT
Start: 2024-03-14 | End: 2024-03-17

## 2024-03-14 RX ORDER — SODIUM CHLORIDE 9 MG/ML
500 INJECTION, SOLUTION INTRAVENOUS ONCE
Refills: 0 | Status: COMPLETED | OUTPATIENT
Start: 2024-03-14 | End: 2024-03-14

## 2024-03-14 RX ORDER — SIMETHICONE 80 MG/1
80 TABLET, CHEWABLE ORAL EVERY 4 HOURS
Refills: 0 | Status: DISCONTINUED | OUTPATIENT
Start: 2024-03-14 | End: 2024-03-17

## 2024-03-14 RX ORDER — LANOLIN
1 OINTMENT (GRAM) TOPICAL EVERY 6 HOURS
Refills: 0 | Status: DISCONTINUED | OUTPATIENT
Start: 2024-03-14 | End: 2024-03-17

## 2024-03-14 RX ORDER — DEXAMETHASONE 0.5 MG/5ML
4 ELIXIR ORAL EVERY 6 HOURS
Refills: 0 | Status: DISCONTINUED | OUTPATIENT
Start: 2024-03-14 | End: 2024-03-15

## 2024-03-14 RX ORDER — TETANUS TOXOID, REDUCED DIPHTHERIA TOXOID AND ACELLULAR PERTUSSIS VACCINE, ADSORBED 5; 2.5; 8; 8; 2.5 [IU]/.5ML; [IU]/.5ML; UG/.5ML; UG/.5ML; UG/.5ML
0.5 SUSPENSION INTRAMUSCULAR ONCE
Refills: 0 | Status: DISCONTINUED | OUTPATIENT
Start: 2024-03-14 | End: 2024-03-17

## 2024-03-14 RX ORDER — DIPHENHYDRAMINE HCL 50 MG
25 CAPSULE ORAL EVERY 4 HOURS
Refills: 0 | Status: DISCONTINUED | OUTPATIENT
Start: 2024-03-14 | End: 2024-03-15

## 2024-03-14 RX ORDER — NALOXONE HYDROCHLORIDE 4 MG/.1ML
0.1 SPRAY NASAL
Refills: 0 | Status: DISCONTINUED | OUTPATIENT
Start: 2024-03-14 | End: 2024-03-15

## 2024-03-14 RX ORDER — MAGNESIUM HYDROXIDE 400 MG/1
30 TABLET, CHEWABLE ORAL
Refills: 0 | Status: DISCONTINUED | OUTPATIENT
Start: 2024-03-14 | End: 2024-03-17

## 2024-03-14 RX ADMIN — Medication 30 MILLIGRAM(S): at 17:55

## 2024-03-14 RX ADMIN — Medication 975 MILLIGRAM(S): at 22:50

## 2024-03-14 RX ADMIN — HEPARIN SODIUM 5000 UNIT(S): 5000 INJECTION INTRAVENOUS; SUBCUTANEOUS at 06:46

## 2024-03-14 RX ADMIN — Medication 1000 MILLIGRAM(S): at 05:14

## 2024-03-14 RX ADMIN — Medication 30 MILLIGRAM(S): at 13:30

## 2024-03-14 RX ADMIN — Medication 400 MILLIGRAM(S): at 04:51

## 2024-03-14 RX ADMIN — Medication 30 MILLIGRAM(S): at 12:55

## 2024-03-14 RX ADMIN — Medication 975 MILLIGRAM(S): at 22:15

## 2024-03-14 RX ADMIN — Medication 1000 MILLIUNIT(S)/MIN: at 04:51

## 2024-03-14 RX ADMIN — SODIUM CHLORIDE 1000 MILLILITER(S): 9 INJECTION, SOLUTION INTRAVENOUS at 04:16

## 2024-03-14 RX ADMIN — Medication 30 MILLIGRAM(S): at 17:21

## 2024-03-14 RX ADMIN — HEPARIN SODIUM 5000 UNIT(S): 5000 INJECTION INTRAVENOUS; SUBCUTANEOUS at 18:21

## 2024-03-14 NOTE — DISCHARGE NOTE OB - ADDITIONAL INSTRUCTIONS
After discharge, please stay on pelvic rest for 6 weeks, meaning no sexual intercourse, no tampons and no douching.  No driving for 2 weeks as women can loose a lot of blood during delivery and there is a possibility of being lightheaded/fainting.  No lifting objects heavier than baby for two weeks.  Expect to have vaginal bleeding/spotting for up to six weeks.  The bleeding should get lighter and more white/light brown with time.  For bleeding soaking more than a pad an hour or passing clots greater than the size of your fist, come in to the emergency department.    Take blood pressure 3 times a day and keep a blood pressure log. Call clinic for BPs >150/>100. Please come to the hospital for BPs >160/>110 or if you have headache not relieved by Tylenol, blurry vision, shortness of breath, new swelling in the hands and feet, or pain in the right upper abdomen. Follow up in clinic between 2-3 days of discharge for blood pressure check.    Follow up in clinic in 2 weeks for incision check.  Call clinic for noticeable increase in redness or swelling at incision, discharge from incision, or opening of skin at incision site.

## 2024-03-14 NOTE — OB NEONATOLOGY/PEDIATRICIAN DELIVERY SUMMARY - BABY A: APGAR 1 MIN SCORE, DELIVERY
"----- Message from Olena Watkins sent at 9/26/2018 11:52 AM CDT -----  Contact: Violeta Champion [phone]  RX request - refill or new RX.  Is this a refill or new RX:  Refill   RX name and strength: HYDROcodone-acetaminophen (NORCO)  mg per tablet  Directions:   Is this a 30 day or 90 day RX:    Local pharmacy or mail order pharmacy:  Local Pharmacy   Pharmacy name and phone # (DON'T enter "on file" or "in chart"): [facility] [address] [place] [address] [phone] (Phone)  [phone] (Fax)      Comments:  Violeta states the script was sent to the pharmacy but the pharmacy need a diagnosis code.          " 8

## 2024-03-14 NOTE — OB NEONATOLOGY/PEDIATRICIAN DELIVERY SUMMARY - NSPEDSNEONOTESA_OBGYN_ALL_OB_FT
Peds called to OR 4 for a 38.1 wk LGA IDM female born via repeat CS to a 32 y/o  mother.  No significant maternal history. Prenatal history of GDMA2 (uncontrolled) and PEC without severe feature. Maternal labs include Blood Type O+, HIV - , RPR NR , Rubella I , Hep B - , GBS unknown. No rupture no labor. AROM at birth with clear fluids.  Baby emerged vigorous, crying, was warmed, dried, suctioned, and stimulated with APGARS of 8/8. CPAP started at 4MOL for dusky color and SPO2 of 70%. Peds was then called. On arrival infant was deep and bulb suctioned, chest PT performed. Infant was pink and well perfused. Oxygen stats appropriate for MOL. Total CPAP for approximately 5min. Highest settings 5/30%. Nuchal x1. Terminal meconium. Stool x 2 in warmer. Mom plans to initiate breastfeeding, declines Hep B vaccine. Highest maternal temp: 37.0. EOS 0.05.    Physical Exam:  Gen: no acute distress, +grimace  HEENT:  anterior fontanel open soft and flat, nondysmorphic facies, no cleft lip/palate, ears normal set, no ear pits or tags, nares clinically patent  Resp: Normal respiratory effort without grunting or retractions, good air entry b/l, clear to auscultation bilaterally  Cardio: Present S1/S2, regular rate and rhythm, no murmurs  Abd: soft, non tender, non distended, umbilical cord with 3 vessels  Neuro: +palmar and plantar grasp, +suck, +samantha, normal tone  Extremities: negative shaw and ortolani maneuvers, moving all extremities, no clavicular crepitus or stepoff  Skin: pink, warm  Genitals: Normal female anatomy, Roland 1, anus patent

## 2024-03-14 NOTE — DISCHARGE NOTE OB - HOSPITAL COURSE
Patient underwent an uncomplicated repeat LTCS for newly diagnosed preeclampsia without severe features.   QBL:254       Hct: 35.1->33    Patient’s postoperative course was unremarkable and she remained hemodynamically stable and afebrile throughout. Upon discharge on POD_, the patient is ambulating and voiding spontaneously, tolerating oral intake, pain was well controlled with oral medication, and vital signs were stable. Patient underwent an uncomplicated repeat LTCS for newly diagnosed preeclampsia without severe features.   QBL:254       Hct: 35.1->33    Patient’s postoperative course was unremarkable and she remained hemodynamically stable and afebrile throughout. Upon discharge on POD4, the patient is ambulating and voiding spontaneously, tolerating oral intake, pain was well controlled with oral medication, and vital signs were stable.

## 2024-03-14 NOTE — DISCHARGE NOTE OB - MEDICATION SUMMARY - MEDICATIONS TO STOP TAKING
I will STOP taking the medications listed below when I get home from the hospital:    insulin 14units qhs

## 2024-03-14 NOTE — OB PROVIDER DELIVERY SUMMARY - NSLOWPPHRISK_OBGYN_A_OB
Pt arrived from [facility] with elevated blood pressures. Serial blood pressures being taken. A Page CNM notified of patient arrival.   Carlos Pregnancy/Less than or equal to 4 previous vaginal births/No known bleeding disorder/No history of postpartum hemorrhage

## 2024-03-14 NOTE — DISCHARGE NOTE OB - PLAN OF CARE
Uncomplicated . Routine postoperative care. Anticipate full recovery.  your blood pressure monitor from [facility] on 1st floor of [facility]. Follow-up in your OB office within 1 week for a blood pressure check.   Please take your blood pressure 3x/day. Call your doctor if your blood pressure is 140 (top number) OR 90 (bottom number) or higher. Return to hospital if your blood pressure is 160 (top number)  (bottom number) or higher. Call your doctor if you experience symptoms such as headache, blurry vision, epigastric pain, or nausea/vomiting. After discharge, please stay on pelvic rest for 6 weeks, meaning no sexual intercourse, no tampons and no douching.  No driving for 2 weeks as women can loose a lot of blood during delivery and there is a possibility of being lightheaded/fainting.  No lifting objects heavier than baby for two weeks.  Expect to have vaginal bleeding/spotting for up to six weeks.  The bleeding should get lighter and more white/light brown with time.  For bleeding soaking more than a pad an hour or passing clots greater than the size of your fist, come in to the emergency department.    Follow up in OB office in 1-2 weeks for incision check.  Call for noticeable increase in redness or swelling at incision, discharge from incision, or opening of skin at incision site

## 2024-03-14 NOTE — DISCHARGE NOTE OB - CARE PROVIDER_API CALL
China Valderrama  Obstetrics and Gynecology  [address] [place] [address]  Phone: [phone]  Fax: [phone]  Follow Up Time: 1-3 days
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
## 2024-03-14 NOTE — DISCHARGE NOTE OB - MEDICATION SUMMARY - MEDICATIONS TO TAKE
I will START or STAY ON the medications listed below when I get home from the hospital:    Electronic blood pressure cuff  -- Please take your blood pressure three times a day. Please call your MD if your blood pressure if 140/90 or higher. Please go to the hospital if your blood pressure is 160/110 or higher.  -- Indication: For preeclampsia without severe features    ibuprofen 600 mg oral tablet  -- 1 tab(s) by mouth every 6 hours as needed for  moderate pain  -- Indication: For moderate pain    acetaminophen 325 mg oral tablet  -- 3 tab(s) by mouth every 6 hours as needed for  mild pain  -- Indication: For mild pain    PNV Prenatal oral tablet  -- 1 tab(s) orally  -- Indication: For  delivery delivered   I will START or STAY ON the medications listed below when I get home from the hospital:    Electronic blood pressure cuff  -- Please take your blood pressure three times a day. Please call your MD if your blood pressure if 140/90 or higher. Please go to the hospital if your blood pressure is 160/110 or higher.  -- Indication: For blood pressure monitoring     ibuprofen 600 mg oral tablet  -- 1 tab(s) by mouth every 6 hours as needed for  moderate pain  -- Indication: For moderate pain    acetaminophen 325 mg oral tablet  -- 3 tab(s) by mouth every 6 hours as needed for  mild pain  -- Indication: For mild pain    Prenatal Multivitamins with Folic Acid 1 mg oral tablet  -- 1 tab(s) by mouth once a day  -- Indication: For vitamins     ferrous sulfate 325 mg (65 mg elemental iron) oral tablet  -- 1 tab(s) by mouth once a day  -- Indication: For vitamins

## 2024-03-14 NOTE — OB PROVIDER DELIVERY SUMMARY - NSSELHIDDEN_OBGYN_ALL_OB_FT
[NS_DeliveryAttending1_OBGYN_ALL_OB_FT:[id number]],[NS_DeliveryRN_OBGYN_ALL_OB_FT:[id number]=]

## 2024-03-14 NOTE — DISCHARGE NOTE OB - PATIENT PORTAL LINK FT
You can access the FollowMyHealth Patient Portal offered by [facility] by registering at the following website: [email or web address]. By joining [facility]’s FollowMyHealth portal, you will also be able to view your health information using other applications (apps) compatible with our system.

## 2024-03-14 NOTE — OB RN DELIVERY SUMMARY - NSSELHIDDEN_OBGYN_ALL_OB_FT
[NS_DeliveryAttending1_OBGYN_ALL_OB_FT:[id number]=],[NS_DeliveryRN_OBGYN_ALL_OB_FT:[id number]] [NS_DeliveryAttending1_OBGYN_ALL_OB_FT:[id number]=],[NS_DeliveryRN_OBGYN_ALL_OB_FT:[id number]=],[NS_DeliveryAssist1_OBGYN_ALL_OB_FT:[id number]=]

## 2024-03-14 NOTE — OB PROVIDER DELIVERY SUMMARY - NSPROVIDERDELIVERYNOTE_OBGYN_ALL_OB_FT
viable female infant, cephalic presentation, weight 3740, APGARS 8/8  grossly normal uterus, b/l tubes and ovaries  hysterotomy closed in 1 layer with vicryl  interseed placed over the hysterotomy  extra figure of 8s placed with vicryl suture  rectus muscle reapproximated with vicryl suture over the bladder    254/1750/140    Dictation per Dr Harris viable female infant, cephalic presentation, weight 3740, APGARS 8/8  grossly normal uterus, b/l tubes and ovaries  hysterotomy closed in 1 layer with vicryl  interseed placed over the hysterotomy  extra figure of 8s placed with vicryl suture  rectus muscle reapproximated with vicryl suture over the bladder    254/1750/140    Dictation per Dr Harris (Dictation # [id number])

## 2024-03-14 NOTE — DISCHARGE NOTE OB - REASON FOR ADMISSION
Newly diagnosed preeclampsia for repeat  Newly diagnosed preeclampsia without severe featurs for repeat , GDMA2

## 2024-03-14 NOTE — DISCHARGE NOTE OB - NS MD DC FALL RISK RISK
For information on Fall & Injury Prevention, visit: [email or web address] OR  [email or web address] OR  https://www.cdc.gov/steadi/patient.html

## 2024-03-14 NOTE — DISCHARGE NOTE OB - CARE PLAN
Principal Discharge DX:	 delivery delivered  Assessment and plan of treatment:	Uncomplicated . Routine postoperative care. Anticipate full recovery.   1 Principal Discharge DX:	 delivery delivered  Assessment and plan of treatment:	After discharge, please stay on pelvic rest for 6 weeks, meaning no sexual intercourse, no tampons and no douching.  No driving for 2 weeks as women can loose a lot of blood during delivery and there is a possibility of being lightheaded/fainting.  No lifting objects heavier than baby for two weeks.  Expect to have vaginal bleeding/spotting for up to six weeks.  The bleeding should get lighter and more white/light brown with time.  For bleeding soaking more than a pad an hour or passing clots greater than the size of your fist, come in to the emergency department.    Follow up in OB office in 1-2 weeks for incision check.  Call for noticeable increase in redness or swelling at incision, discharge from incision, or opening of skin at incision site  Secondary Diagnosis:	Preeclampsia  Assessment and plan of treatment:	 your blood pressure monitor from [facility] on 1st floor of [facility]. Follow-up in your OB office within 1 week for a blood pressure check.   Please take your blood pressure 3x/day. Call your doctor if your blood pressure is 140 (top number) OR 90 (bottom number) or higher. Return to hospital if your blood pressure is 160 (top number)  (bottom number) or higher. Call your doctor if you experience symptoms such as headache, blurry vision, epigastric pain, or nausea/vomiting.

## 2024-03-14 NOTE — OB RN INTRAOPERATIVE NOTE - NSSELHIDDEN_OBGYN_ALL_OB_FT
[NS_DeliveryAttending1_OBGYN_ALL_OB_FT:[id number]=],[NS_DeliveryRN_OBGYN_ALL_OB_FT:[id number]=]

## 2024-03-15 RX ORDER — FERROUS SULFATE 325(65) MG
325 TABLET ORAL DAILY
Refills: 0 | Status: DISCONTINUED | OUTPATIENT
Start: 2024-03-15 | End: 2024-03-17

## 2024-03-15 RX ORDER — SENNA PLUS 8.6 MG/1
2 TABLET ORAL AT BEDTIME
Refills: 0 | Status: DISCONTINUED | OUTPATIENT
Start: 2024-03-15 | End: 2024-03-17

## 2024-03-15 RX ORDER — IBUPROFEN 200 MG
600 TABLET ORAL EVERY 6 HOURS
Refills: 0 | Status: DISCONTINUED | OUTPATIENT
Start: 2024-03-15 | End: 2024-03-17

## 2024-03-15 RX ADMIN — Medication 600 MILLIGRAM(S): at 17:15

## 2024-03-15 RX ADMIN — Medication 975 MILLIGRAM(S): at 16:08

## 2024-03-15 RX ADMIN — Medication 1 TABLET(S): at 16:08

## 2024-03-15 RX ADMIN — HEPARIN SODIUM 5000 UNIT(S): 5000 INJECTION INTRAVENOUS; SUBCUTANEOUS at 17:15

## 2024-03-15 RX ADMIN — Medication 325 MILLIGRAM(S): at 16:08

## 2024-03-15 RX ADMIN — Medication 600 MILLIGRAM(S): at 05:56

## 2024-03-15 RX ADMIN — Medication 30 MILLIGRAM(S): at 00:53

## 2024-03-15 RX ADMIN — Medication 975 MILLIGRAM(S): at 10:45

## 2024-03-15 RX ADMIN — Medication 975 MILLIGRAM(S): at 10:04

## 2024-03-15 RX ADMIN — Medication 600 MILLIGRAM(S): at 22:18

## 2024-03-15 RX ADMIN — Medication 600 MILLIGRAM(S): at 18:19

## 2024-03-15 RX ADMIN — Medication 975 MILLIGRAM(S): at 17:10

## 2024-03-15 RX ADMIN — HEPARIN SODIUM 5000 UNIT(S): 5000 INJECTION INTRAVENOUS; SUBCUTANEOUS at 05:55

## 2024-03-15 RX ADMIN — Medication 30 MILLIGRAM(S): at 00:15

## 2024-03-15 RX ADMIN — Medication 600 MILLIGRAM(S): at 06:32

## 2024-03-15 RX ADMIN — Medication 600 MILLIGRAM(S): at 23:00

## 2024-03-15 NOTE — PROVIDER CONTACT NOTE (OTHER) - ASSESSMENT
pt denies headache/ blurry vision/ epigastric pain/ lightheadedness/ dizziness/ nausea and vomiting This was a shared visit with the BRENNAN. I reviewed and verified the documentation and independently performed the documented:

## 2024-03-15 NOTE — PROVIDER CONTACT NOTE (OTHER) - SITUATION
pt's blood pressure on machine- 143/79, manual BP-148/80
Pt s/p repeat C/S. Urine output of 10cc at second hour.

## 2024-03-15 NOTE — PROVIDER CONTACT NOTE (OTHER) - BACKGROUND
s/p c/s from 3/13 @ 0110. pt is PEC without severe features
s/p repeat C/S. Pt received 500cc LR bolus at 0217 for urine output of 34 at first hour. Adequate hourly urine for pt is 50cc/hr.

## 2024-03-16 RX ORDER — FERROUS SULFATE 325(65) MG
1 TABLET ORAL
Qty: 0 | Refills: 0 | DISCHARGE
Start: 2024-03-16

## 2024-03-16 RX ORDER — OXYCODONE HYDROCHLORIDE 5 MG/1
5 TABLET ORAL
Refills: 0 | Status: DISCONTINUED | OUTPATIENT
Start: 2024-03-16 | End: 2024-03-17

## 2024-03-16 RX ADMIN — Medication 600 MILLIGRAM(S): at 11:08

## 2024-03-16 RX ADMIN — Medication 325 MILLIGRAM(S): at 11:08

## 2024-03-16 RX ADMIN — Medication 1 TABLET(S): at 11:08

## 2024-03-16 RX ADMIN — HEPARIN SODIUM 5000 UNIT(S): 5000 INJECTION INTRAVENOUS; SUBCUTANEOUS at 06:03

## 2024-03-16 RX ADMIN — Medication 975 MILLIGRAM(S): at 23:00

## 2024-03-16 RX ADMIN — Medication 600 MILLIGRAM(S): at 12:56

## 2024-03-16 RX ADMIN — Medication 600 MILLIGRAM(S): at 06:03

## 2024-03-16 RX ADMIN — Medication 600 MILLIGRAM(S): at 18:42

## 2024-03-16 RX ADMIN — Medication 975 MILLIGRAM(S): at 03:33

## 2024-03-16 RX ADMIN — Medication 975 MILLIGRAM(S): at 22:04

## 2024-03-16 RX ADMIN — Medication 600 MILLIGRAM(S): at 17:39

## 2024-03-16 RX ADMIN — Medication 975 MILLIGRAM(S): at 03:03

## 2024-03-16 RX ADMIN — Medication 975 MILLIGRAM(S): at 09:19

## 2024-03-16 RX ADMIN — HEPARIN SODIUM 5000 UNIT(S): 5000 INJECTION INTRAVENOUS; SUBCUTANEOUS at 17:39

## 2024-03-16 RX ADMIN — Medication 975 MILLIGRAM(S): at 10:15

## 2024-03-16 RX ADMIN — Medication 975 MILLIGRAM(S): at 15:21

## 2024-03-16 RX ADMIN — Medication 600 MILLIGRAM(S): at 06:44

## 2024-03-16 RX ADMIN — Medication 975 MILLIGRAM(S): at 16:25

## 2024-03-16 NOTE — PROGRESS NOTE ADULT - ASSESSMENT
A/P: 32yo POD#1 s/p LTCS.  Patient is stable and doing well post-operatively.      #Postop from LTCS  -   - Hct 35.1->33.0  - Continue regular diet.  - Increase ambulation.  - Continue motrin, tylenol, oxycodone PRN for pain control.     #PEC without severe features  -cw BP monitoring, BP: 136/81 (03-14-24 @ 06:05) (112/62 - 145/90)  -HELLP wnl, P/C: 0.9  -Pt not on medication  -No PEC symptoms    Ashleigh Khan MD PGY1
A/P: 34yo rLTCS POD2 c/b PEC w/o SF.  Patient is stable and doing well post-partum.     - Pain well controlled, continue current pain regimen  - Increase ambulation, SCDs when not ambulating  - Continue regular diet  - Routine PP care  - BPs well controlled with no BP medication at this time. To continue to monitor per routine.    Amanda Liang CNM
A/P: 32yo rLTCS POD3 c/b PEC w/o SF.  Patient is stable and doing well post-partum.     #PEC w/o SF  - BPs mildly elevated overnight 120s-140s/80s   - No antihypertensives on board  - HELLP: wnl, P/C: 0.9; repeat PRN     #Postpartum State  - Pain well controlled, continue current pain regimen  - Increase ambulation, SCDs when not ambulating  - Continue regular diet  - Routine PP care    Jamal Peterson, PGY-1

## 2024-03-17 VITALS
RESPIRATION RATE: 17 BRPM | TEMPERATURE: 98 F | HEART RATE: 91 BPM | SYSTOLIC BLOOD PRESSURE: 144 MMHG | OXYGEN SATURATION: 100 % | DIASTOLIC BLOOD PRESSURE: 69 MMHG

## 2024-03-17 RX ADMIN — Medication 600 MILLIGRAM(S): at 06:11

## 2024-03-17 RX ADMIN — Medication 975 MILLIGRAM(S): at 21:45

## 2024-03-17 RX ADMIN — Medication 600 MILLIGRAM(S): at 12:54

## 2024-03-17 RX ADMIN — Medication 325 MILLIGRAM(S): at 12:24

## 2024-03-17 RX ADMIN — Medication 600 MILLIGRAM(S): at 12:24

## 2024-03-17 RX ADMIN — Medication 1 TABLET(S): at 12:41

## 2024-03-17 RX ADMIN — Medication 975 MILLIGRAM(S): at 21:01

## 2024-03-17 RX ADMIN — Medication 600 MILLIGRAM(S): at 18:57

## 2024-03-17 RX ADMIN — Medication 600 MILLIGRAM(S): at 07:00

## 2024-03-17 RX ADMIN — HEPARIN SODIUM 5000 UNIT(S): 5000 INJECTION INTRAVENOUS; SUBCUTANEOUS at 06:11

## 2024-03-17 RX ADMIN — Medication 600 MILLIGRAM(S): at 19:27

## 2024-03-17 RX ADMIN — HEPARIN SODIUM 5000 UNIT(S): 5000 INJECTION INTRAVENOUS; SUBCUTANEOUS at 18:57

## 2024-03-17 NOTE — PROGRESS NOTE ADULT - SUBJECTIVE AND OBJECTIVE BOX
OB Progress Note: rLTCS POD2    S: Patient seen at bedside. Patient feels well. Pain is well controlled, tolerating regular diet, passing flatus, voiding spontaneously, ambulating without difficulty. Denies HA, blurry vision, RUQ pain, heavy vaginal bleeding, CP/SOB, N/V, lightheadedness/dizziness. Pt reports [place] is in the NICU with hypoglycemia but doing well.     O:  Vitals:  Vital Signs Last 24 Hrs  T(C): 36.5 (15 Mar 2024 06:08), Max: 37.1 (14 Mar 2024 18:00)  T(F): 97.7 (15 Mar 2024 06:08), Max: 98.8 (14 Mar 2024 18:00)  HR: 91 (15 Mar 2024 06:08) (88 - 101)  BP: 138/88 (15 Mar 2024 06:08) (118/71 - 138/88)  BP(mean): --  RR: 18 (15 Mar 2024 06:08) (18 - 18)  SpO2: 99% (15 Mar 2024 06:08) (97% - 100%)    Parameters below as of 14 Mar 2024 22:50  Patient On (Oxygen Delivery Method): room air        MEDICATIONS  (STANDING):  acetaminophen     Tablet .. 975 milliGRAM(s) Oral <User Schedule>  diphtheria/tetanus/pertussis (acellular) Vaccine (Adacel) 0.5 milliLiter(s) IntraMuscular once  ferrous    sulfate 325 milliGRAM(s) Oral daily  heparin   Injectable 5000 Unit(s) SubCutaneous every 12 hours  ibuprofen  Tablet. 600 milliGRAM(s) Oral every 6 hours  lactated ringers. 1000 milliLiter(s) (125 mL/Hr) IV Continuous <Continuous>  prenatal multivitamin 1 Tablet(s) Oral daily  senna 2 Tablet(s) Oral at bedtime      Labs:  Blood type: O Positive  Rubella IgG: RPR: Negative                          11.0<L>   14.93<H> >-----------< 352    (  @ 08:05 )             33.0<L>                        11.8   9.83 >-----------< 443<H>    (  @ 19:25 )             35.1    24 @ 06:20      129<L>  |  101  |  17  ----------------------------<  213<H>  4.4   |  17<L>  |  0.71    24 @ 19:25      137  |  106  |  18  ----------------------------<  145<H>  4.5   |  19<L>  |  0.70        Ca    8.8      14 Mar 2024 06:20  Ca    9.6      13 Mar 2024 19:25    TPro  6.2  /  Alb  3.1<L>  /  TBili  0.3  /  DBili  x   /  AST  20  /  ALT  13  /  AlkPhos  78  24 @ 06:20  TPro  7.5  /  Alb  3.5  /  TBili  0.2  /  DBili  x   /  AST  14  /  ALT  14  /  AlkPhos  96  24 @ 19:25          Physical Exam:  General: NAD  Breasts: filling  Abdomen: soft, non-tender, non-distended, fundus firm, midline, 2FB below U   Incision: c/d/i with sterri strips  Perineum: lochia light  Extremities: No erythema/edema  
Pt decided to stay an extra day instead of early discharge. Pt is doing well with no complaints. Denies any CP, SOB, N/V, F/C, lightheadedness dizziness, blurry vision, HA, RUQ pain or calf pain. Ambulating, voiding and eating regular food without any difficulty Passed flatus. Minimal lochia. Pain is controlled  VSS   CV RRR  Resp CTA  Abd soft, nd, nt, +bs, incision c/d/i  Ext FROM, NT, no edema    A/P  s/p RCS due to Preeclampsia without SF and uncontrolled GDMA2. Doing well. Meeting milestones. Labs stable. VSS. POD4  Cont POD care  Preeclampsia without SF- f/u BP. preeclampsia precautions given. RTO in 2-3 days for BP check  GDMA2- 2hr GCT during postpartum period.   Encourage ambulation, voiding, breastfeeding and ISS use  Anticipate discharge tomorrow  China Valderrama MD MPH
INTERVAL HPI/OVERNIGHT EVENTS:  33y Female s/p c section under spinal anesthesia with duramorph for post op analgesia      Vital Signs Last 24 Hrs  T(C): 37 (14 Mar 2024 06:05), Max: 37 (13 Mar 2024 18:36)  T(F): 98.6 (14 Mar 2024 06:05), Max: 98.6 (13 Mar 2024 18:36)  HR: 94 (14 Mar 2024 06:05) (82 - 107)  BP: 136/81 (14 Mar 2024 06:05) (112/62 - 145/90)  BP(mean): 91 (14 Mar 2024 05:00) (80 - 96)  RR: 17 (14 Mar 2024 06:05) (15 - 19)  SpO2: 99% (14 Mar 2024 06:05) (96% - 100%)    Parameters below as of 14 Mar 2024 06:05  Patient On (Oxygen Delivery Method): room air            Patient's overall anesthesia satisfaction: Positive    Patients pain is well controlled with IT duramorph    No respiratory events overnight    No pruritis at this time    Patient doing well     No headache      No residual numbness or weakness, sensory and motor function intact.    No anesthetic complications or complaints noted or reported          .            
OB Progress Note:  Delivery, POD#1    S: 34yo POD#1 s/p LTCS . Her pain is well controlled. She is tolerating a regular diet and passing flatus. Denies N/V. Denies CP/SOB/lightheadedness/dizziness.   She has not yet ambulated since   Voiding via de la torre catheter.  Denies headaches, blurry vision, RUQ pain, edema.    O:   Vital Signs Last 24 Hrs  T(C): 37 (14 Mar 2024 06:05), Max: 37 (13 Mar 2024 18:36)  T(F): 98.6 (14 Mar 2024 06:05), Max: 98.6 (13 Mar 2024 18:36)  HR: 94 (14 Mar 2024 06:05) (82 - 107)  BP: 136/81 (14 Mar 2024 06:05) (112/62 - 145/90)  BP(mean): 91 (14 Mar 2024 05:00) (80 - 96)  RR: 17 (14 Mar 2024 06:05) (15 - 19)  SpO2: 99% (14 Mar 2024 06:05) (96% - 100%)    Parameters below as of 14 Mar 2024 06:05  Patient On (Oxygen Delivery Method): room air        MEDICATIONS  (STANDING):  acetaminophen     Tablet .. 975 milliGRAM(s) Oral <User Schedule>  chlorhexidine 2% Cloths 1 Application(s) Topical daily  diphtheria/tetanus/pertussis (acellular) Vaccine (Adacel) 0.5 milliLiter(s) IntraMuscular once  heparin   Injectable 5000 Unit(s) SubCutaneous every 12 hours  ibuprofen  Tablet. 600 milliGRAM(s) Oral every 6 hours  ketorolac   Injectable 30 milliGRAM(s) IV Push every 6 hours  lactated ringers Bolus 1000 milliLiter(s) IV Bolus once  lactated ringers. 1000 milliLiter(s) (125 mL/Hr) IV Continuous <Continuous>  lactated ringers. 1000 milliLiter(s) (125 mL/Hr) IV Continuous <Continuous>  morphine PF Spinal 0.1 milliGRAM(s) IntraThecal. once  oxytocin Infusion 333.333 milliUNIT(s)/Min (1000 mL/Hr) IV Continuous <Continuous>  oxytocin Infusion 333.333 milliUNIT(s)/Min (1000 mL/Hr) IV Continuous <Continuous>      MEDICATIONS  (PRN):  butorphanol Injectable 0.25 milliGRAM(s) IV Push every 6 hours PRN Pruritus  dexAMETHasone  Injectable 4 milliGRAM(s) IV Push every 6 hours PRN Nausea  diphenhydrAMINE 25 milliGRAM(s) Oral every 6 hours PRN Pruritus  diphenhydrAMINE Injectable 25 milliGRAM(s) IV Push every 4 hours PRN Pruritus  lanolin Ointment 1 Application(s) Topical every 6 hours PRN Sore Nipples  magnesium hydroxide Suspension 30 milliLiter(s) Oral two times a day PRN Constipation  naloxone Injectable 0.1 milliGRAM(s) IV Push every 3 minutes PRN For ANY of the following changes in patient status:  A. Breaths Per Minute LESS THAN 10, B. Oxygen saturation LESS THAN 90%, C. Sedation score of 6 for Stop After: 4 Times  ondansetron Injectable 4 milliGRAM(s) IV Push every 6 hours PRN Nausea  oxyCODONE    IR 10 milliGRAM(s) Oral every 3 hours PRN Moderate Pain (4 - 6)  oxyCODONE    IR 5 milliGRAM(s) Oral every 3 hours PRN Mild Pain (1 - 3)  oxyCODONE    IR 5 milliGRAM(s) Oral every 3 hours PRN Moderate to Severe Pain (4-10)  oxyCODONE    IR 5 milliGRAM(s) Oral once PRN Moderate to Severe Pain (4-10)  simethicone 80 milliGRAM(s) Chew every 4 hours PRN Gas        Labs:  Blood type: O Positive  Rubella IgG: RPR:                           11.0<L>   14.93<H> >-----------< 352    (  @ 08:05 )             33.0<L>                        11.8   9.83 >-----------< 443<H>    (  @ 19:25 )             35.1    24 @ 06:20      129<L>  |  101  |  17  ----------------------------<  213<H>  4.4   |  17<L>  |  0.71    24 @ 19:25      137  |  106  |  18  ----------------------------<  145<H>  4.5   |  19<L>  |  0.70        Ca    8.8      14 Mar 2024 06:20  Ca    9.6      13 Mar 2024 19:25    TPro  6.2  /  Alb  3.1<L>  /  TBili  0.3  /  DBili  x   /  AST  20  /  ALT  13  /  AlkPhos  78  24 @ 06:20  TPro  7.5  /  Alb  3.5  /  TBili  0.2  /  DBili  x   /  AST  14  /  ALT  14  /  AlkPhos  96  24 @ 19:25          PE:  General: NAD  Abdomen: Mildly distended, appropriately tender, incision c/d/i.  : Lochia WNL  Extremities: No erythema, no pitting edema  
R1 POD#3 rLTCS Progress Note    Patient seen and examined at bedside, no acute overnight events. No acute complaints, pain well controlled. Patient is ambulating and tolerating regular diet. Has passed flatus. Patient voiding independently. Denies CP, SOB, N/V, HA, blurred vision, epigastric pain. Bleeding minimal.    Vital Signs Last 24 Hours  T(C): 36.9 (03-16-24 @ 06:02), Max: 37.2 (03-16-24 @ 01:45)  HR: 79 (03-16-24 @ 06:02) (79 - 100)  BP: 142/80 (03-16-24 @ 06:02) (120/63 - 148/80)  RR: 18 (03-16-24 @ 06:02) (17 - 18)  SpO2: 100% (03-16-24 @ 06:02) (100% - 100%)    I&O's Summary      Physical Exam:  General: NAD  Abdomen: Soft, non-tender, non-distended, fundus firm  Incision: Pfannenstiel incision CDI, subcuticular suture closure  Pelvic: Lochia wnl    Labs:    Blood Type: O Positive  Antibody Screen: Negative  RPR: Negative               11.0   14.93 )-----------( 352      ( 03-14 @ 08:05 )             33.0                11.8   9.83  )-----------( 443      ( 03-13 @ 19:25 )             35.1         MEDICATIONS  (STANDING):  acetaminophen     Tablet .. 975 milliGRAM(s) Oral <User Schedule>  diphtheria/tetanus/pertussis (acellular) Vaccine (Adacel) 0.5 milliLiter(s) IntraMuscular once  ferrous    sulfate 325 milliGRAM(s) Oral daily  heparin   Injectable 5000 Unit(s) SubCutaneous every 12 hours  ibuprofen  Tablet. 600 milliGRAM(s) Oral every 6 hours  lactated ringers. 1000 milliLiter(s) (125 mL/Hr) IV Continuous <Continuous>  prenatal multivitamin 1 Tablet(s) Oral daily  senna 2 Tablet(s) Oral at bedtime    MEDICATIONS  (PRN):  diphenhydrAMINE 25 milliGRAM(s) Oral every 6 hours PRN Pruritus  lanolin Ointment 1 Application(s) Topical every 6 hours PRN Sore Nipples  magnesium hydroxide Suspension 30 milliLiter(s) Oral two times a day PRN Constipation  oxyCODONE    IR 5 milliGRAM(s) Oral once PRN Moderate to Severe Pain (4-10)  oxyCODONE    IR 5 milliGRAM(s) Oral every 3 hours PRN Moderate to Severe Pain (4-10)  simethicone 80 milliGRAM(s) Chew every 4 hours PRN Gas  
Pt is doing well with no complaints. Denies any CP, SOB, N/V, F/C, lightheadedness dizziness, blurry vision, HA, RUQ pain or calf pain. Ambulating, voiding and eating regular food without any difficulty Passed flatus. Minimal lochia. Pain is controlled  VSS   CV RRR  Resp CTA  Abd soft, nd, nt, +bs, incision c/d/i  Ext FROM, NT, no edema    A/P  s/p RCS due to Preeclampsia without SF and uncontrolled GDMA2. Doing well. Meeting milestones. Labs stable. VSS  Cont POD care  Encourage ambulation, voiding, breastfeeding and ISS use  Anticipate discharge 1-2  F/u in 2 weeks for incision check  China Valderrama MD MPH

## 2024-03-21 ENCOUNTER — APPOINTMENT (OUTPATIENT)
Dept: ANTEPARTUM | Facility: CLINIC | Age: 34
End: 2024-03-21

## 2024-03-28 ENCOUNTER — APPOINTMENT (OUTPATIENT)
Dept: ANTEPARTUM | Facility: CLINIC | Age: 34
End: 2024-03-28

## 2024-05-04 NOTE — PROCEDURAL SAFETY CHECKLIST WITH OR WITHOUT SEDATION - FIRE RISK ASSESSMENT ADDRESSED
Kev here for Octreotide injection today.     Patient denies any concerns with previous injections.  See MAR for injection details.   Patient tolerated procedure well.   Patient discharged in stable, ambulatory condition.    done

## 2024-05-14 NOTE — OB PROVIDER DELIVERY SUMMARY - BABY A: DATE/TIME OF DELIVERY
- Home meds: Valsartan 320 mg, Cardizem 120 mg, Toprol 50 mg BID, Spironolactone 25 mg, Torsemide 10 mg three times per week  - Blood pressure is adequately controlled     Plan:  - Continue home Cardizem and Toprol  - Hold home Torsemide, spironolactone  - Hold formulary equivalent losartan   13-Mar-2024 23:59

## 2025-01-23 NOTE — OB PROVIDER H&P - NS_FORCEPSDELIVERY_OBGYN_ALL_OB_NU
Spoke to patient and informed him of setting change and referral to [facility].   Patient requested [email or web address] message be sent to him.     0

## 2025-02-10 ENCOUNTER — NON-APPOINTMENT (OUTPATIENT)
Age: 35
End: 2025-02-10

## 2025-02-28 ENCOUNTER — APPOINTMENT (OUTPATIENT)
Dept: ANTEPARTUM | Facility: CLINIC | Age: 35
End: 2025-02-28
Payer: MEDICAID

## 2025-02-28 ENCOUNTER — APPOINTMENT (OUTPATIENT)
Dept: OBGYN | Facility: CLINIC | Age: 35
End: 2025-02-28
Payer: MEDICAID

## 2025-02-28 ENCOUNTER — ASOB RESULT (OUTPATIENT)
Age: 35
End: 2025-02-28

## 2025-02-28 VITALS
DIASTOLIC BLOOD PRESSURE: 86 MMHG | SYSTOLIC BLOOD PRESSURE: 122 MMHG | WEIGHT: 226 LBS | BODY MASS INDEX: 38.58 KG/M2 | HEIGHT: 64 IN

## 2025-02-28 DIAGNOSIS — Z78.9 OTHER SPECIFIED HEALTH STATUS: ICD-10-CM

## 2025-02-28 DIAGNOSIS — O24.419 GESTATIONAL DIABETES MELLITUS IN PREGNANCY, UNSPECIFIED CONTROL: ICD-10-CM

## 2025-02-28 PROCEDURE — 81025 URINE PREGNANCY TEST: CPT

## 2025-02-28 PROCEDURE — 99203 OFFICE O/P NEW LOW 30 MIN: CPT | Mod: TH,25

## 2025-02-28 PROCEDURE — 76817 TRANSVAGINAL US OBSTETRIC: CPT

## 2025-03-04 ENCOUNTER — ASOB RESULT (OUTPATIENT)
Age: 35
End: 2025-03-04

## 2025-03-04 ENCOUNTER — APPOINTMENT (OUTPATIENT)
Dept: ANTEPARTUM | Facility: CLINIC | Age: 35
End: 2025-03-04
Payer: MEDICAID

## 2025-03-04 ENCOUNTER — APPOINTMENT (OUTPATIENT)
Dept: OBGYN | Facility: CLINIC | Age: 35
End: 2025-03-04
Payer: MEDICAID

## 2025-03-04 VITALS
WEIGHT: 226 LBS | DIASTOLIC BLOOD PRESSURE: 80 MMHG | BODY MASS INDEX: 38.58 KG/M2 | SYSTOLIC BLOOD PRESSURE: 130 MMHG | HEIGHT: 64 IN

## 2025-03-04 DIAGNOSIS — O36.80X0 PREGNANCY WITH INCONCLUSIVE FETAL VIABILITY, NOT APPLICABLE OR UNSPECIFIED: ICD-10-CM

## 2025-03-04 DIAGNOSIS — Z11.3 ENCOUNTER FOR SCREENING FOR INFECTIONS WITH A PREDOMINANTLY SEXUAL MODE OF TRANSMISSION: ICD-10-CM

## 2025-03-04 LAB
ABORH: NORMAL
ANTIBODY SCREEN: NORMAL
HCG SERPL-MCNC: ABNORMAL MIU/ML
HCG UR QL: POSITIVE
QUALITY CONTROL: YES

## 2025-03-04 PROCEDURE — 76817 TRANSVAGINAL US OBSTETRIC: CPT

## 2025-03-04 PROCEDURE — 99204 OFFICE O/P NEW MOD 45 MIN: CPT | Mod: TH,25

## 2025-03-09 PROBLEM — O36.80X0 PREGNANCY WITH INCONCLUSIVE FETAL VIABILITY: Status: ACTIVE | Noted: 2025-03-09

## 2025-03-09 LAB
BASOPHILS # BLD AUTO: 0.03 K/UL
BASOPHILS NFR BLD AUTO: 0.4 %
C TRACH RRNA SPEC QL NAA+PROBE: NOT DETECTED
EOSINOPHIL # BLD AUTO: 0.38 K/UL
EOSINOPHIL NFR BLD AUTO: 4.7 %
HCG SERPL-MCNC: ABNORMAL MIU/ML
HCT VFR BLD CALC: 37.1 %
HGB BLD-MCNC: 11.6 G/DL
IMM GRANULOCYTES NFR BLD AUTO: 0.4 %
LYMPHOCYTES # BLD AUTO: 2.66 K/UL
LYMPHOCYTES NFR BLD AUTO: 32.8 %
MAN DIFF?: NORMAL
MCHC RBC-ENTMCNC: 29 PG
MCHC RBC-ENTMCNC: 31.3 G/DL
MCV RBC AUTO: 92.8 FL
MONOCYTES # BLD AUTO: 0.61 K/UL
MONOCYTES NFR BLD AUTO: 7.5 %
N GONORRHOEA RRNA SPEC QL NAA+PROBE: NOT DETECTED
NEUTROPHILS # BLD AUTO: 4.4 K/UL
NEUTROPHILS NFR BLD AUTO: 54.2 %
PLATELET # BLD AUTO: 482 K/UL
PROGEST SERPL-MCNC: 7.3 NG/ML
RBC # BLD: 4 M/UL
RBC # FLD: 13 %
SOURCE AMPLIFICATION: NORMAL
WBC # FLD AUTO: 8.11 K/UL

## 2025-03-10 ENCOUNTER — NON-APPOINTMENT (OUTPATIENT)
Age: 35
End: 2025-03-10

## 2025-03-12 ENCOUNTER — APPOINTMENT (OUTPATIENT)
Dept: OBGYN | Facility: CLINIC | Age: 35
End: 2025-03-12
Payer: MEDICAID

## 2025-03-12 PROCEDURE — 36415 COLL VENOUS BLD VENIPUNCTURE: CPT

## 2025-03-13 LAB
HCG SERPL-MCNC: 6932 MIU/ML
PROGEST SERPL-MCNC: 7.1 NG/ML

## 2025-03-14 ENCOUNTER — NON-APPOINTMENT (OUTPATIENT)
Age: 35
End: 2025-03-14

## 2025-03-18 ENCOUNTER — ASOB RESULT (OUTPATIENT)
Age: 35
End: 2025-03-18

## 2025-03-18 ENCOUNTER — APPOINTMENT (OUTPATIENT)
Dept: OBGYN | Facility: CLINIC | Age: 35
End: 2025-03-18
Payer: MEDICAID

## 2025-03-18 ENCOUNTER — APPOINTMENT (OUTPATIENT)
Dept: ANTEPARTUM | Facility: CLINIC | Age: 35
End: 2025-03-18
Payer: MEDICAID

## 2025-03-18 VITALS
WEIGHT: 226 LBS | BODY MASS INDEX: 38.58 KG/M2 | SYSTOLIC BLOOD PRESSURE: 136 MMHG | DIASTOLIC BLOOD PRESSURE: 84 MMHG | HEIGHT: 64 IN

## 2025-03-18 DIAGNOSIS — O03.9 COMPLETE OR UNSPECIFIED SPONTANEOUS ABORTION W/OUT COMPLICATION: ICD-10-CM

## 2025-03-18 PROCEDURE — 99213 OFFICE O/P EST LOW 20 MIN: CPT | Mod: TH

## 2025-03-18 PROCEDURE — 76830 TRANSVAGINAL US NON-OB: CPT

## 2025-03-21 PROBLEM — O03.9 MISCARRIAGE: Status: ACTIVE | Noted: 2025-03-21

## 2025-05-21 ENCOUNTER — APPOINTMENT (OUTPATIENT)
Dept: OBGYN | Facility: CLINIC | Age: 35
End: 2025-05-21